# Patient Record
Sex: MALE | Race: WHITE | NOT HISPANIC OR LATINO | Employment: OTHER | URBAN - METROPOLITAN AREA
[De-identification: names, ages, dates, MRNs, and addresses within clinical notes are randomized per-mention and may not be internally consistent; named-entity substitution may affect disease eponyms.]

---

## 2017-08-31 ENCOUNTER — GENERIC CONVERSION - ENCOUNTER (OUTPATIENT)
Dept: OTHER | Facility: OTHER | Age: 82
End: 2017-08-31

## 2017-08-31 DIAGNOSIS — C67.3 MALIGNANT NEOPLASM OF ANTERIOR WALL OF BLADDER (HCC): ICD-10-CM

## 2018-01-22 VITALS
SYSTOLIC BLOOD PRESSURE: 162 MMHG | DIASTOLIC BLOOD PRESSURE: 98 MMHG | BODY MASS INDEX: 25.76 KG/M2 | HEIGHT: 68 IN | WEIGHT: 170 LBS

## 2018-02-28 RX ORDER — SIMVASTATIN 80 MG
TABLET ORAL
COMMUNITY
End: 2018-09-13 | Stop reason: ALTCHOICE

## 2018-03-01 ENCOUNTER — OFFICE VISIT (OUTPATIENT)
Dept: UROLOGY | Facility: MEDICAL CENTER | Age: 83
End: 2018-03-01
Payer: MEDICARE

## 2018-03-01 VITALS
BODY MASS INDEX: 26.43 KG/M2 | SYSTOLIC BLOOD PRESSURE: 176 MMHG | DIASTOLIC BLOOD PRESSURE: 90 MMHG | WEIGHT: 174.4 LBS | HEIGHT: 68 IN

## 2018-03-01 DIAGNOSIS — Z92.3 PERSONAL HISTORY OF IRRADIATION: ICD-10-CM

## 2018-03-01 DIAGNOSIS — C67.4 MALIGNANT NEOPLASM OF POSTERIOR WALL OF URINARY BLADDER (HCC): Primary | ICD-10-CM

## 2018-03-01 DIAGNOSIS — C61 MALIGNANT NEOPLASM OF PROSTATE (HCC): ICD-10-CM

## 2018-03-01 DIAGNOSIS — Z90.79 ACQUIRED ABSENCE OF OTHER GENITAL ORGAN(S): ICD-10-CM

## 2018-03-01 DIAGNOSIS — Z90.6 ACQUIRED ABSENCE OF OTHER PARTS OF URINARY TRACT: ICD-10-CM

## 2018-03-01 LAB
SL AMB  POCT GLUCOSE, UA: NEGATIVE
SL AMB LEUKOCYTE ESTERASE,UA: ABNORMAL
SL AMB POCT BILIRUBIN,UA: NEGATIVE
SL AMB POCT BLOOD,UA: ABNORMAL
SL AMB POCT CLARITY,UA: ABNORMAL
SL AMB POCT COLOR,UA: YELLOW
SL AMB POCT KETONES,UA: NEGATIVE
SL AMB POCT NITRITE,UA: NEGATIVE
SL AMB POCT PH,UA: 6
SL AMB POCT SPECIFIC GRAVITY,UA: 1.01
SL AMB POCT URINE PROTEIN: ABNORMAL
SL AMB POCT UROBILINOGEN: 0.2

## 2018-03-01 PROCEDURE — 99214 OFFICE O/P EST MOD 30 MIN: CPT | Performed by: UROLOGY

## 2018-03-01 PROCEDURE — 81003 URINALYSIS AUTO W/O SCOPE: CPT | Performed by: UROLOGY

## 2018-03-01 NOTE — PROGRESS NOTES
Assessment/Plan:      Diagnoses and all orders for this visit:    Malignant neoplasm of posterior wall of urinary bladder (HCC)  -     POCT urine dip auto non-scope    Malignant neoplasm of prostate (HCC)  -     PSA; Future    Acquired absence of other genital organ(s)    Personal history of irradiation  -     PSA; Future    Acquired absence of other parts of urinary tract    Other orders  -     aspirin 81 MG tablet; Take 1 tablet by mouth daily  -     simvastatin (ZOCOR) 80 mg tablet; Take by mouth          Subjective:  As always in great spirits feels well and no issues to report  Patient ID: Pam Camarillo is a 80 y o  male  HPI  He is 15-,1/2 years after his radical retropubic prostatectomy about 8 and half years after external beam radiation therapy for a biochemical recurrence  Subsequently developed bladder CA and treated with TURBT and BCG accordingly  He required a chronic suprapubic tube that ultimately eroded into his rectum  He was therefore and now status post a cystectomy and ileal conduit  He is doing absolutely fantastic with that  Review of Systems   Constitutional: Negative  HENT: Positive for hearing loss  Eyes: Negative  Respiratory: Negative  Cardiovascular: Negative  Gastrointestinal: Negative  Endocrine: Negative  Genitourinary: Negative  Musculoskeletal: Negative  Skin: Negative  Allergic/Immunologic: Negative  Neurological: Negative  Hematological: Negative  Psychiatric/Behavioral: Negative  Objective:     Physical Exam   Constitutional: He is oriented to person, place, and time  He appears well-developed and well-nourished  HENT:   Head: Normocephalic and atraumatic  Right Ear: Decreased hearing is noted  Left Ear: Decreased hearing is noted  Eyes: Conjunctivae are normal  Pupils are equal, round, and reactive to light  Neck: Normal range of motion  Neck supple  Cardiovascular: Normal rate and regular rhythm  Pulmonary/Chest: Effort normal and breath sounds normal    Abdominal: Soft  Bowel sounds are normal  He exhibits no distension and no mass  There is no tenderness  No hernia  Hernia confirmed negative in the ventral area  Conduit everted, viable appearance  The appliance fits well  Genitourinary: Rectum normal and penis normal    Musculoskeletal: Normal range of motion  Neurological: He is alert and oriented to person, place, and time  Skin: Skin is warm and dry  Psychiatric: He has a normal mood and affect  His behavior is normal  Judgment and thought content normal    Nursing note and vitals reviewed  Impression: No significant sonographic abnormality of bilateral kidneys  03/01/2017   Result Narrative   History: Personal history of malignant neoplasm of bladder [Z85 51 (ICD-10-CM)]    Ultrasound of limited retroperitoneum with focus on the kidneys  Comparison:  10/13/2015  The right kidney demonstrates no hydronephrosis and measures 9 4 cm  An  echogenic focus measuring 4 mm at the right renal cortex has no associated soft  tissue lesion  The left kidney demonstrates no hydronephrosis and measures 10 3  cm  Normal renal echogenicity bilaterally

## 2018-03-01 NOTE — PROGRESS NOTES
Impression: No significant sonographic abnormality of bilateral kidneys  Workstation:[id number]   Result Narrative   History: Personal history of malignant neoplasm of bladder [Z85 51 (ICD-10-CM)]    Ultrasound of limited retroperitoneum with focus on the kidneys  Comparison:  10/13/2015  The right kidney demonstrates no hydronephrosis and measures 9 4 cm  An  echogenic focus measuring 4 mm at the right renal cortex has no associated soft  tissue lesion  The left kidney demonstrates no hydronephrosis and measures 10 3  cm  Normal renal echogenicity bilaterally

## 2018-09-13 ENCOUNTER — OFFICE VISIT (OUTPATIENT)
Dept: UROLOGY | Facility: MEDICAL CENTER | Age: 83
End: 2018-09-13
Payer: MEDICARE

## 2018-09-13 VITALS
WEIGHT: 169 LBS | BODY MASS INDEX: 25.61 KG/M2 | HEIGHT: 68 IN | SYSTOLIC BLOOD PRESSURE: 158 MMHG | DIASTOLIC BLOOD PRESSURE: 80 MMHG

## 2018-09-13 DIAGNOSIS — C61 PROSTATE CANCER (HCC): Primary | ICD-10-CM

## 2018-09-13 DIAGNOSIS — Z92.3 PERSONAL HISTORY OF IRRADIATION: ICD-10-CM

## 2018-09-13 DIAGNOSIS — Z90.79 HISTORY OF RADICAL RETROPUBIC PROSTATECTOMY: ICD-10-CM

## 2018-09-13 DIAGNOSIS — C67.4 MALIGNANT NEOPLASM OF POSTERIOR WALL OF URINARY BLADDER (HCC): ICD-10-CM

## 2018-09-13 DIAGNOSIS — Z90.6 HISTORY OF TOTAL CYSTECTOMY: ICD-10-CM

## 2018-09-13 LAB
SL AMB  POCT GLUCOSE, UA: ABNORMAL
SL AMB LEUKOCYTE ESTERASE,UA: ABNORMAL
SL AMB POCT BILIRUBIN,UA: ABNORMAL
SL AMB POCT BLOOD,UA: ABNORMAL
SL AMB POCT CLARITY,UA: CLEAR
SL AMB POCT COLOR,UA: YELLOW
SL AMB POCT KETONES,UA: ABNORMAL
SL AMB POCT NITRITE,UA: POSITIVE
SL AMB POCT PH,UA: 6.5
SL AMB POCT SPECIFIC GRAVITY,UA: 1.02
SL AMB POCT URINE PROTEIN: ABNORMAL
SL AMB POCT UROBILINOGEN: 0.2

## 2018-09-13 PROCEDURE — 99214 OFFICE O/P EST MOD 30 MIN: CPT | Performed by: UROLOGY

## 2018-09-13 PROCEDURE — 81003 URINALYSIS AUTO W/O SCOPE: CPT | Performed by: UROLOGY

## 2018-09-13 RX ORDER — ALLOPURINOL 100 MG/1
TABLET ORAL
COMMUNITY
Start: 2018-08-25

## 2018-09-13 RX ORDER — ATORVASTATIN CALCIUM 20 MG/1
TABLET, FILM COATED ORAL
COMMUNITY
Start: 2018-08-25

## 2018-09-13 NOTE — PROGRESS NOTES
Assessment/Plan:      Diagnoses and all orders for this visit:    Prostate cancer ([facility] [address] )  -     PSA Total, Diagnostic; Future  -     Comprehensive metabolic panel; Future    Malignant neoplasm of posterior wall of urinary bladder (HCC)  -     POCT urine dip auto non-scope  -     Comprehensive metabolic panel; Future    History of radical retropubic prostatectomy    Personal history of irradiation    History of total cystectomy    Other orders  -     atorvastatin (LIPITOR) 20 mg tablet;   -     allopurinol (ZYLOPRIM) 100 mg tablet;           Subjective:  No urologic complaints     Patient ID: Andreea Uribe is a 80 y o  male  HPI  He is 16 years after his radical retropubic prostatectomy about 9 years after external beam radiation therapy for a biochemical recurrence for his prostate cancer  Subsequently developed superficial bladder CA and treated with TURBT and BCG accordingly  He developed bladder neck contractures at the radiation therapy and therefore required a chronic suprapubic tube, that ultimately eroded into his rectum  He then underwent and is status post a cystectomy and ileal conduit  He is doing absolutely very well with that  He states that he has a good appetite without any evidence of hematuria, weight loss or abdominal/bone pain  He had an interval of a lower GI bleed, requiring hospitalization, likely due to radiation affect to his rectum  Review of Systems   Constitutional: Negative  HENT: Positive for hearing loss  Eyes: Negative  Respiratory: Negative  Cardiovascular: Negative  Gastrointestinal: Positive for anal bleeding and blood in stool  Endocrine: Negative  Genitourinary: Negative  Negative for hematuria  Musculoskeletal: Negative  Skin: Negative  Allergic/Immunologic: Negative  Neurological: Negative  Hematological: Negative  Psychiatric/Behavioral: Negative            Objective:     Physical Exam   Constitutional: He is oriented to person, place, and time  He appears well-developed and well-nourished  No distress  HENT:   Head: Normocephalic and atraumatic  Right Ear: Decreased hearing is noted  Left Ear: Decreased hearing is noted  Nose: Nose normal    Mouth/Throat: Oropharynx is clear and moist    Eyes: Conjunctivae and EOM are normal  Pupils are equal, round, and reactive to light  No scleral icterus  Neck: Normal range of motion  Neck supple  Pulmonary/Chest: Effort normal  No respiratory distress  Abdominal: Soft  He exhibits no distension  There is no tenderness  Stoma intact, appliance fitting well   Musculoskeletal: Normal range of motion  He exhibits no edema or tenderness  Lymphadenopathy:     He has no cervical adenopathy  Neurological: He is alert and oriented to person, place, and time  No cranial nerve deficit  Skin: Skin is warm and dry  No rash noted  No erythema  No pallor  Psychiatric: He has a normal mood and affect  His behavior is normal  Judgment and thought content normal    Nursing note and vitals reviewed        PSA from 09/04/2018 is 0 3  Serum chemistry from 08/22/2018 BUN 34, creatinine 1 55

## 2018-11-19 ENCOUNTER — TELEPHONE (OUTPATIENT)
Dept: UROLOGY | Facility: MEDICAL CENTER | Age: 83
End: 2018-11-19